# Patient Record
Sex: MALE | Race: BLACK OR AFRICAN AMERICAN | NOT HISPANIC OR LATINO | URBAN - METROPOLITAN AREA
[De-identification: names, ages, dates, MRNs, and addresses within clinical notes are randomized per-mention and may not be internally consistent; named-entity substitution may affect disease eponyms.]

---

## 2017-05-16 ENCOUNTER — EMERGENCY (EMERGENCY)
Facility: HOSPITAL | Age: 37
LOS: 1 days | Discharge: HOME | End: 2017-05-16
Attending: EMERGENCY MEDICINE | Admitting: EMERGENCY MEDICINE
Payer: MEDICAID

## 2017-05-16 VITALS
HEART RATE: 90 BPM | RESPIRATION RATE: 16 BRPM | OXYGEN SATURATION: 98 % | DIASTOLIC BLOOD PRESSURE: 72 MMHG | SYSTOLIC BLOOD PRESSURE: 108 MMHG | TEMPERATURE: 98 F

## 2017-05-16 DIAGNOSIS — F10.129 ALCOHOL ABUSE WITH INTOXICATION, UNSPECIFIED: ICD-10-CM

## 2017-05-16 DIAGNOSIS — T73.0XXA STARVATION, INITIAL ENCOUNTER: ICD-10-CM

## 2017-05-16 DIAGNOSIS — R41.82 ALTERED MENTAL STATUS, UNSPECIFIED: ICD-10-CM

## 2017-05-16 PROCEDURE — 99283 EMERGENCY DEPT VISIT LOW MDM: CPT

## 2017-05-16 NOTE — ED PROVIDER NOTE - NS ED MD SCRIBE ATTENDING SCRIBE SECTIONS
PROGRESS NOTE/RESULTS/INTAKE ASSESSMENT/SCREENINGS/DISPOSITION/PHYSICAL EXAM/HISTORY OF PRESENT ILLNESS/PAST MEDICAL/SURGICAL/SOCIAL HISTORY/VITAL SIGNS( Pullset)/REVIEW OF SYSTEMS/HIV

## 2017-05-16 NOTE — ED PROVIDER NOTE - OBJECTIVE STATEMENT
36y M with h/o EtOH abuse presents with alcohol intoxication without seizure, vomiting. Also admits to smoking marijuana. No signs of trauma and he has otherwise no complaints. Previous ED visit related to alcohol intoxication.

## 2017-05-16 NOTE — ED PROVIDER NOTE - CONSTITUTIONAL, MLM
normal... Pt unkempt, intoxicated, awake, alert, oriented to person, place, time/situation and in no apparent distress. Pt unkempt, smell of ETOH on breath, awake, alert, oriented to person, place, time/situation and in no apparent distress.

## 2017-05-16 NOTE — ED PROVIDER NOTE - PROGRESS NOTE DETAILS
The scribe's documentation has been prepared under my direction and personally reviewed by me in its entirety. I confirm that the note above accurately reflects all work, treatment, procedures, and medical decision making performed by me.  BECKY Arrington

## 2017-05-16 NOTE — ED PROVIDER NOTE - MEDICAL DECISION MAKING DETAILS
35 y/o M presents to ED, unkempt, poor personal hygiene and slurred speech.  He admits to drinking alcohol and smoking marijuana.  He denies any complaints.  Pt tolerated PO.  Pt discharged ambulatory with steady gait. 35 y/o M presents to ED, unkempt, poor personal hygiene and slurred speech.  He admits to drinking alcohol and smoking marijuana.  He denies any complaints.  Pt tolerated PO.  Pt discharged ambulatory with steady gait.  Pt agitated at discharge and escorted by security.

## 2019-02-25 ENCOUNTER — EMERGENCY (EMERGENCY)
Facility: HOSPITAL | Age: 39
LOS: 1 days | Discharge: HOME | End: 2019-02-25
Admitting: EMERGENCY MEDICINE
Payer: SELF-PAY

## 2019-02-25 VITALS
TEMPERATURE: 99 F | OXYGEN SATURATION: 98 % | DIASTOLIC BLOOD PRESSURE: 76 MMHG | HEART RATE: 82 BPM | SYSTOLIC BLOOD PRESSURE: 115 MMHG | RESPIRATION RATE: 18 BRPM

## 2019-02-25 VITALS
SYSTOLIC BLOOD PRESSURE: 156 MMHG | DIASTOLIC BLOOD PRESSURE: 111 MMHG | OXYGEN SATURATION: 99 % | TEMPERATURE: 98 F | HEART RATE: 98 BPM | RESPIRATION RATE: 16 BRPM
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
## 2019-02-25 DIAGNOSIS — R41.82 ALTERED MENTAL STATUS, UNSPECIFIED: ICD-10-CM

## 2019-02-25 DIAGNOSIS — S05.92XA UNSPECIFIED INJURY OF LEFT EYE AND ORBIT, INITIAL ENCOUNTER: ICD-10-CM

## 2019-02-25 PROCEDURE — 99284 EMERGENCY DEPT VISIT MOD MDM: CPT

## 2019-02-25 PROCEDURE — 70486 CT MAXILLOFACIAL W/O DYE: CPT | Mod: 26

## 2019-02-25 NOTE — ED ADULT NURSE NOTE - NSIMPLEMENTINTERV_GEN_ALL_ED
Implemented All Universal Safety Interventions:  [place] to call system. Call bell, personal items and telephone within reach. Instruct patient to call for assistance. Room bathroom lighting operational. Non-slip footwear when patient is off stretcher. Physically safe environment: no spills, clutter or unnecessary equipment. Stretcher in lowest position, wheels locked, appropriate side rails in place.
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
## 2019-02-25 NOTE — ED ADULT NURSE NOTE - CHPI ED NUR SYMPTOMS NEG
no weakness/no nausea/no fever/no abdominal distension/no pain/no confusion/no disorientation/no abdominal pain/no vomiting/no chills

## 2019-02-25 NOTE — ED PROVIDER NOTE - PHYSICAL EXAMINATION
L eye: edematous, injected with watery discharge. + periorbital ecchymosis, nontender. attempted to examine eye with fluorosein and tetracaine but patient refused.
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
## 2022-03-10 NOTE — ED ADULT TRIAGE NOTE - STATUS:
Applied H Plasty Text: Given the location of the defect, shape of the defect and the proximity to free margins a H-plasty was deemed most appropriate for repair.  Using a sterile surgical marker, the appropriate advancement arms of the H-plasty were drawn incorporating the defect and placing the expected incisions within the relaxed skin tension lines where possible. The area thus outlined was incised deep to adipose tissue with a #15 scalpel blade. The skin margins were undermined to an appropriate distance in all directions utilizing iris scissors.  The opposing advancement arms were then advanced into place in opposite direction and anchored with interrupted buried subcutaneous sutures.